# Patient Record
(demographics unavailable — no encounter records)

---

## 2025-02-05 NOTE — HISTORY OF PRESENT ILLNESS
[FreeTextEntry1] : Tania is having chest pain and SOB on exertion. She has not lost all her weight yet but her symptoms are progressive . No rest pain. Pain is a tightness in her chest on a daily regular basis relieved by rest. She had stress test last year but was not like this.

## 2025-02-05 NOTE — DISCUSSION/SUMMARY
[FreeTextEntry1] : The patient is a 40-year-old female post-partum pre-eclampsia now with persistent chest pain #1 CV- normal LV function, mild to mod MR, equivocal exercise stress test, proceed with ECHO first. #2 HTN- off nifedipine #3 General- stress mgmt discussed, hip pain. [EKG obtained to assist in diagnosis and management of assessed problem(s)] : EKG obtained to assist in diagnosis and management of assessed problem(s)

## 2025-04-28 NOTE — PHYSICAL EXAM
[Alert] : alert [Oriented x 3] : ~L oriented x 3 [Well Nourished] : well nourished [Conjunctiva Non-injected] : conjunctiva non-injected [No Visual Lymphadenopathy] : no visual  lymphadenopathy [No Clubbing] : no clubbing [No Edema] : no edema [No Bromhidrosis] : no bromhidrosis [No Chromhidrosis] : no chromhidrosis [Full Body Skin Exam Performed] : performed [FreeTextEntry3] : Full body skin exam was performed. The patient is well-appearing, in no acute distress, alert and oriented x 3. Mood and affect are normal. A complete cutaneous examination of the scalp, face, neck, chest, abdomen, back, bilateral arms, bilateral legs, buttocks, digits, nails, eyelids, conjunctiva and lips reveals the following significant findings: - fairly uniform and regular brown macules and papules on the trunk and extremities - cherry red papules scattered on torso - well healed scar, R abdomen

## 2025-04-28 NOTE — ASSESSMENT
[FreeTextEntry1] : #Multiple benign nevi #Solar lentigo #Screening exam for skin cancer - no suspicious lesions on exam today - TBSE performed today - Advised sun protection. Recommended OTC sunscreen products (EltaMD/Neutrogena/La Roche Posay), including SPF30+ with broadband UV protection as well as proper use. Discussed OTC sun protective clothing - Counseled patient to monitor for changes, including mole monitoring and self-skin exams  #Hx of nodular BCC, R abdomen 2022 - s/p ED&C - well healed scar today  RTC 1 year for FBSE

## 2025-04-28 NOTE — HISTORY OF PRESENT ILLNESS
[FreeTextEntry1] : spots [de-identified] : PATO GRANADO is a 41 year old F who present for f/u as below. Last seen 10/2022    #FBSE.  Spots scattered on body x years. Asymptomatic and unchanged. No alleviating/aggravating factors. Never been treated.  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

## 2025-06-25 NOTE — HISTORY OF PRESENT ILLNESS
[FreeTextEntry1] :  41-year-old female post-partum pre-eclampsia who presents with chest heaviness and fluttering. The fluttering happens first then heaviness only when laying down. Thought GERD and took TUMS but no relief. Lasts until she sleeps. Every night.

## 2025-06-25 NOTE — DISCUSSION/SUMMARY
[FreeTextEntry1] : The patient is a 41-year-old female post-partum pre-eclampsia in past with supine symptoms of concern for arrhythmia.  #1 CV- normal LV function, mild MR, Event monitor and exercise stress test. #2 HTN- off nifedipine #3 Psych- increasing fluoxetine to 60mg  [EKG obtained to assist in diagnosis and management of assessed problem(s)] : EKG obtained to assist in diagnosis and management of assessed problem(s)